# Patient Record
Sex: FEMALE | Race: WHITE | HISPANIC OR LATINO | Employment: UNEMPLOYED | URBAN - METROPOLITAN AREA
[De-identification: names, ages, dates, MRNs, and addresses within clinical notes are randomized per-mention and may not be internally consistent; named-entity substitution may affect disease eponyms.]

---

## 2023-03-09 ENCOUNTER — OFFICE VISIT (OUTPATIENT)
Dept: INTERNAL MEDICINE CLINIC | Facility: CLINIC | Age: 48
End: 2023-03-09

## 2023-03-09 VITALS
SYSTOLIC BLOOD PRESSURE: 107 MMHG | HEART RATE: 87 BPM | OXYGEN SATURATION: 98 % | BODY MASS INDEX: 30.18 KG/M2 | WEIGHT: 164 LBS | DIASTOLIC BLOOD PRESSURE: 74 MMHG | TEMPERATURE: 98.4 F | HEIGHT: 62 IN

## 2023-03-09 DIAGNOSIS — Z11.4 SCREENING FOR HIV (HUMAN IMMUNODEFICIENCY VIRUS): ICD-10-CM

## 2023-03-09 DIAGNOSIS — Z23 NEED FOR TDAP VACCINATION: ICD-10-CM

## 2023-03-09 DIAGNOSIS — Z11.59 NEED FOR HEPATITIS C SCREENING TEST: ICD-10-CM

## 2023-03-09 DIAGNOSIS — Z23 FLU VACCINE NEED: ICD-10-CM

## 2023-03-09 DIAGNOSIS — Z13.1 DIABETES MELLITUS SCREENING: ICD-10-CM

## 2023-03-09 DIAGNOSIS — C50.919 BREAST CANCER (HCC): Primary | ICD-10-CM

## 2023-03-09 DIAGNOSIS — Z12.11 ENCOUNTER FOR SCREENING COLONOSCOPY: ICD-10-CM

## 2023-03-09 DIAGNOSIS — Z12.4 CERVICAL CANCER SCREENING: ICD-10-CM

## 2023-03-09 DIAGNOSIS — Z13.220 SCREENING FOR CHOLESTEROL LEVEL: ICD-10-CM

## 2023-03-09 NOTE — PROGRESS NOTES
[address] [facility]  INTERNAL MEDICINE OFFICE VISIT     PATIENT INFORMATION     Alon Villa   53 y o  female   MRN: [id number]    ASSESSMENT/PLAN     Diagnoses and all orders for this visit:    Breast cancer [facility])  -     Ambulatory Referral to Hematology / Oncology; Future  -     Ambulatory Referral to Gynecology; Future  -     CBC and differential; Future  -     Comprehensive metabolic panel; Future  -     TSH, 3rd generation with Free T4 reflex; Future  -     Ambulatory Referral to Financial Counseling Program; Future  -     Mammo screening bilateral w 3d & cad; Future    Cervical cancer screening  -     Ambulatory Referral to Gynecology; Future    Need for hepatitis C screening test  -     Hepatitis C antibody; Future    Screening for HIV (human immunodeficiency virus)  -     : HIV 1/2 AB/AG w Reflex [facility] for 2 yr old and above; Future    Encounter for screening colonoscopy  -     Ambulatory referral for colonoscopy; Future    Diabetes mellitus screening  -     Hemoglobin A1C; Future    Screening for cholesterol level  -     Lipid Panel with Direct LDL reflex; Future    Need for Tdap vaccination  -     Tdap Vaccine greater than or equal to 8yo    Flu vaccine need  -     influenza vaccine, quadrivalent, recombinant, PF, 0 5 mL, for patients 18 yr+ (FLUBLOK)        Schedule a follow-up appointment in 6 months  Discussed with patient regarding significant cancer history and breast cancer s/p mastectomy and chemo radiation  Referral to oncology for further management  Referral to Gynecology to establish care and risk of cervical cancer  Screening mammography and colonoscopy given risk factors  Establishing care, will screen for cholesterol, diabetes, thyroid, CBC/CMP  Agreeable for HepC and HIV screen  Influenza and Tdap vaccines needed  HEALTH MAINTENANCE     There is no immunization history on file for this patient    [address] COMPLAINT     Chief Complaint Patient presents with   • New Patient Visit      HISTORY OF PRESENT ILLNESS     Yuri Chairez is a 53 yr old F with PMH of breast ca presents from [facility] to establish care  Diagnosed in Nov 2021 s/p double mastectomy in Feb 2022 and had chemo which completed in Sept 2022 then had radiation treatment  Unsure of classification of cancer but family is able to translate medical records  Would like referral to oncology for follow up after radiation therapy  Pt arrives to area one month ago from [facility] with Aunt and son who are established here  Completed COVID vaccine x2  Agreeable to flu vaccine and screening  Colonoscopy agreeable  FH- Mother had metastatic cervical ca 48, father had lung ca passed at 79 and was heavy smoker  No sudden deaths, heart attacks or strokes  Reports R sided intermittent  5/10 discomfort in axillary area  However, she states it is the site of axillary lymph node removal  Takes ibuprofen, 400 mg per day at most  Denies rashes or swelling or tenderness  REVIEW OF SYSTEMS     Review of Systems   Constitutional: Negative for chills, fatigue and fever  HENT: Negative for congestion, rhinorrhea and sore throat  Eyes: Negative for visual disturbance  Respiratory: Negative for cough, chest tightness and shortness of breath  Cardiovascular: Negative for chest pain and palpitations  Gastrointestinal: Negative for abdominal pain, diarrhea, nausea and vomiting  Endocrine: Negative for polyuria  Genitourinary: Negative for dysuria  Musculoskeletal: Negative for arthralgias  Neurological: Negative for dizziness and headaches  OBJECTIVE     Vitals:    03/09/23 0919   BP: 107/74   BP Location: Left arm   Patient Position: Sitting   Cuff Size: Large   Pulse: 87   Temp: 98 4 °F (36 9 °C)   TempSrc: Temporal   SpO2: 98%   Weight: 74 4 kg (164 lb)   Height: 5' 2" (1 575 m)     Physical Exam  Vitals reviewed     Constitutional:       General: She is not in acute distress  HENT:      Head: Normocephalic and atraumatic  Mouth/Throat:      Pharynx: Oropharynx is clear  Eyes:      Conjunctiva/sclera: Conjunctivae normal    Cardiovascular:      Rate and Rhythm: Normal rate and regular rhythm  Pulses: Normal pulses  Heart sounds: Normal heart sounds  Pulmonary:      Effort: Pulmonary effort is normal       Breath sounds: Normal breath sounds  Chest:   Breasts:     Right: No mass or tenderness  Left: No mass or tenderness  Comments: Bilateral mastectomy scar  R axillary tenderness along lymph resection scar  Abdominal:      Palpations: Abdomen is soft  Tenderness: There is no abdominal tenderness  Musculoskeletal:         General: Normal range of motion  Cervical back: Normal range of motion and neck supple  Right lower leg: No edema  Left lower leg: No edema  Lymphadenopathy:      Upper Body:      Right upper body: No axillary adenopathy  Left upper body: No axillary adenopathy  Neurological:      Mental Status: She is alert and oriented to person, place, and time  CURRENT MEDICATIONS   No current outpatient medications on file      PAST MEDICAL & SURGICAL HISTORY     Past Medical History:   Diagnosis Date   • Cancer [facility])     breast-diagnosed Nov 2021     Past Surgical History:   Procedure Laterality Date   • MASTECTOMY, RADICAL Bilateral     January 2023     SOCIAL & FAMILY HISTORY     Social History     Socioeconomic History   • Marital status: Single     Spouse name: Not on file   • Number of children: Not on file   • Years of education: Not on file   • Highest education level: Not on file   Occupational History   • Not on file   Tobacco Use   • Smoking status: Never     Passive exposure: Never   • Smokeless tobacco: Never   Vaping Use   • Vaping Use: Never used   Substance and Sexual Activity   • Alcohol use: Yes     Comment: Socially   • Drug use: Never   • Sexual activity: Not on file   Other Topics Concern   • Not on file   Social History Narrative   • Not on file     Social Determinants of Health     Financial Resource Strain: Medium Risk   • Difficulty of Paying Living Expenses: Somewhat hard   Food Insecurity: No Food Insecurity   • Worried About Running Out of Food in the Last Year: Never true   • Ran Out of Food in the Last Year: Never true   Transportation Needs: No Transportation Needs   • Lack of Transportation (Medical): No   • Lack of Transportation (Non-Medical): No   Physical Activity: Not on file   Stress: Not on file   Social Connections: Not on file   Intimate Partner Violence: Not on file   Housing Stability: Not on file     Social History     Substance and Sexual Activity   Alcohol Use Yes    Comment: Socially     Substance and Sexual Activity   Alcohol Use Yes    Comment: Socially        Substance and Sexual Activity   Drug Use Never     Social History     Tobacco Use   Smoking Status Never   • Passive exposure: Never   Smokeless Tobacco Never     History reviewed  No pertinent family history             ==  Marlen Uriarte MD  PGY-1  [address] Internal Medicine [address] [facility] , [address] [address] [facility] [address] [facility]  Office: [phone]  Fax: [phone]

## 2023-03-10 ENCOUNTER — TELEPHONE (OUTPATIENT)
Dept: HEMATOLOGY ONCOLOGY | Facility: CLINIC | Age: 48
End: 2023-03-10

## 2023-03-10 NOTE — TELEPHONE ENCOUNTER
Patient has a referral on file - Made an attempt to schedule a new patient consultation  A voicemail was left making patient aware of their referral and instructing them to call back at the [facility] phone number in order to schedule their consultation